# Patient Record
Sex: FEMALE | Race: WHITE | NOT HISPANIC OR LATINO | Employment: FULL TIME | URBAN - METROPOLITAN AREA
[De-identification: names, ages, dates, MRNs, and addresses within clinical notes are randomized per-mention and may not be internally consistent; named-entity substitution may affect disease eponyms.]

---

## 2017-01-25 ENCOUNTER — HOSPITAL ENCOUNTER (OUTPATIENT)
Dept: ULTRASOUND IMAGING | Facility: CLINIC | Age: 52
Discharge: HOME | End: 2017-01-25
Attending: FAMILY MEDICINE

## 2017-01-25 ENCOUNTER — COMMUNICATION - HEALTHEAST (OUTPATIENT)
Dept: FAMILY MEDICINE | Facility: CLINIC | Age: 52
End: 2017-01-25

## 2017-01-25 ENCOUNTER — OFFICE VISIT - HEALTHEAST (OUTPATIENT)
Dept: FAMILY MEDICINE | Facility: CLINIC | Age: 52
End: 2017-01-25

## 2017-01-25 DIAGNOSIS — Z78.9 ALCOHOL CONSUMPTION HEAVY: ICD-10-CM

## 2017-01-25 DIAGNOSIS — M54.9 BACK PAIN: ICD-10-CM

## 2017-01-25 DIAGNOSIS — Z00.8 ENCOUNTER FOR BIOMETRIC SCREENING: ICD-10-CM

## 2017-01-25 DIAGNOSIS — F10.10 ALCOHOL ABUSE, UNCOMPLICATED: ICD-10-CM

## 2017-01-25 DIAGNOSIS — Z82.69 FAMILY HISTORY OF RHEUMATIC JOINT DISEASE: ICD-10-CM

## 2017-01-25 LAB
CHOLEST SERPL-MCNC: 178 MG/DL
FASTING STATUS PATIENT QL REPORTED: YES
HDLC SERPL-MCNC: 64 MG/DL
LDLC SERPL CALC-MCNC: 107 MG/DL
TRIGL SERPL-MCNC: 35 MG/DL

## 2017-01-26 ENCOUNTER — COMMUNICATION - HEALTHEAST (OUTPATIENT)
Dept: FAMILY MEDICINE | Facility: CLINIC | Age: 52
End: 2017-01-26

## 2017-05-04 ENCOUNTER — AMBULATORY - HEALTHEAST (OUTPATIENT)
Dept: FAMILY MEDICINE | Facility: CLINIC | Age: 52
End: 2017-05-04

## 2017-05-04 ENCOUNTER — COMMUNICATION - HEALTHEAST (OUTPATIENT)
Dept: FAMILY MEDICINE | Facility: CLINIC | Age: 52
End: 2017-05-04

## 2017-05-04 DIAGNOSIS — Z00.00 HEALTHCARE MAINTENANCE: ICD-10-CM

## 2017-09-24 ENCOUNTER — HEALTH MAINTENANCE LETTER (OUTPATIENT)
Age: 52
End: 2017-09-24

## 2018-07-12 ENCOUNTER — COMMUNICATION - HEALTHEAST (OUTPATIENT)
Dept: TELEHEALTH | Facility: CLINIC | Age: 53
End: 2018-07-12

## 2018-07-12 ENCOUNTER — HOSPITAL ENCOUNTER (OUTPATIENT)
Dept: MAMMOGRAPHY | Facility: CLINIC | Age: 53
Discharge: HOME | End: 2018-07-12
Attending: FAMILY MEDICINE

## 2018-07-12 DIAGNOSIS — Z12.31 VISIT FOR SCREENING MAMMOGRAM: ICD-10-CM

## 2018-09-21 ENCOUNTER — OFFICE VISIT - HEALTHEAST (OUTPATIENT)
Dept: FAMILY MEDICINE | Facility: CLINIC | Age: 53
End: 2018-09-21

## 2018-09-21 DIAGNOSIS — Z00.00 ROUTINE GENERAL MEDICAL EXAMINATION AT A HEALTH CARE FACILITY: ICD-10-CM

## 2018-09-21 LAB
CHOLEST SERPL-MCNC: 214 MG/DL
FASTING STATUS PATIENT QL REPORTED: YES
FASTING STATUS PATIENT QL REPORTED: YES
GLUCOSE BLD-MCNC: 81 MG/DL (ref 70–125)
HDLC SERPL-MCNC: 72 MG/DL
LDLC SERPL CALC-MCNC: 131 MG/DL
TRIGL SERPL-MCNC: 54 MG/DL

## 2018-09-21 RX ORDER — MULTIVIT-MIN/IRON/FOLIC/HRB186 3.3 MG-25
TABLET ORAL
Status: SHIPPED | COMMUNITY
Start: 2018-09-21

## 2018-09-21 ASSESSMENT — MIFFLIN-ST. JEOR: SCORE: 1248.47

## 2018-09-24 ENCOUNTER — COMMUNICATION - HEALTHEAST (OUTPATIENT)
Dept: FAMILY MEDICINE | Facility: CLINIC | Age: 53
End: 2018-09-24

## 2018-11-01 ENCOUNTER — COMMUNICATION - HEALTHEAST (OUTPATIENT)
Dept: FAMILY MEDICINE | Facility: CLINIC | Age: 53
End: 2018-11-01

## 2019-02-22 ENCOUNTER — COMMUNICATION - HEALTHEAST (OUTPATIENT)
Dept: FAMILY MEDICINE | Facility: CLINIC | Age: 54
End: 2019-02-22

## 2019-07-29 ENCOUNTER — RECORDS - HEALTHEAST (OUTPATIENT)
Dept: ADMINISTRATIVE | Facility: OTHER | Age: 54
End: 2019-07-29

## 2019-08-06 ENCOUNTER — RECORDS - HEALTHEAST (OUTPATIENT)
Dept: ADMINISTRATIVE | Facility: OTHER | Age: 54
End: 2019-08-06

## 2019-08-13 ENCOUNTER — RECORDS - HEALTHEAST (OUTPATIENT)
Dept: ADMINISTRATIVE | Facility: OTHER | Age: 54
End: 2019-08-13

## 2019-08-29 ENCOUNTER — RECORDS - HEALTHEAST (OUTPATIENT)
Dept: ADMINISTRATIVE | Facility: OTHER | Age: 54
End: 2019-08-29

## 2019-09-25 ENCOUNTER — RECORDS - HEALTHEAST (OUTPATIENT)
Dept: ADMINISTRATIVE | Facility: OTHER | Age: 54
End: 2019-09-25

## 2021-02-19 ENCOUNTER — RECORDS - HEALTHEAST (OUTPATIENT)
Dept: ADMINISTRATIVE | Facility: OTHER | Age: 56
End: 2021-02-19

## 2021-05-28 ENCOUNTER — RECORDS - HEALTHEAST (OUTPATIENT)
Dept: ADMINISTRATIVE | Facility: CLINIC | Age: 56
End: 2021-05-28

## 2021-05-29 ENCOUNTER — RECORDS - HEALTHEAST (OUTPATIENT)
Dept: ADMINISTRATIVE | Facility: CLINIC | Age: 56
End: 2021-05-29

## 2021-05-30 ENCOUNTER — RECORDS - HEALTHEAST (OUTPATIENT)
Dept: ADMINISTRATIVE | Facility: CLINIC | Age: 56
End: 2021-05-30

## 2021-05-30 VITALS — BODY MASS INDEX: 22.82 KG/M2 | WEIGHT: 141.4 LBS

## 2021-06-01 ENCOUNTER — RECORDS - HEALTHEAST (OUTPATIENT)
Dept: ADMINISTRATIVE | Facility: CLINIC | Age: 56
End: 2021-06-01

## 2021-06-02 ENCOUNTER — RECORDS - HEALTHEAST (OUTPATIENT)
Dept: ADMINISTRATIVE | Facility: CLINIC | Age: 56
End: 2021-06-02

## 2021-06-02 VITALS — BODY MASS INDEX: 22.77 KG/M2 | WEIGHT: 141.7 LBS | HEIGHT: 66 IN
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
## 2021-06-20 ENCOUNTER — HEALTH MAINTENANCE LETTER (OUTPATIENT)
Age: 56
End: 2021-06-20

## 2021-06-20 NOTE — PROGRESS NOTES
ASSESSMENT:  1. Routine general medical examination at a health care facility     - Lipid Cascade  - Glucose  - Tdap vaccine greater than or equal to 8yo IM  - Influenza, Seasonal,Quad Inj, 36+ MOS         PLAN:    Orders Placed This Encounter   Procedures     Tdap vaccine greater than or equal to 8yo IM     Influenza, Seasonal,Quad Inj, 36+ MOS     Lipid Cascade     Order Specific Question:   Fasting is required?     Answer:   Yes     Glucose     There are no discontinued medications.    No Follow-up on file.    Health Maintenance Due   Topic Date Due     DEPRESSION FOLLOW UP  1965     COLONOSCOPY  06/05/2013     INFLUENZA VACCINE RULE BASED (1) 08/01/2018     TD 18+ HE  03/06/2019       CHIEF COMPLAINT:  Chief Complaint   Patient presents with     Annual Exam     fasting labs       HISTORY OF PRESENT ILLNESS:  Georgina Paz is a 53 y.o. female presenting to the clinic today for a physical exam.     She is a biometric screening completed for her work.    She has had a colonoscopy and they found something like 8 or 9 polyps a told her to come back in 2-3 years she has had that done and now she think she is on a 5 year plan but we do not have any records so we have had to ask her to contact the clinic where she had a colonoscopy is completed and send us the records.  My assistant called [facility] and they do not have a record of her.    Healthy Habits:    Patient reports regular exercise, dental and eye exams. Uses healthy diet. Always uses seatbelts. Reports uses medications as directed.  Alcohol: 4 per week  Smoking: none  Caffeine use: 2 per day  Drug use: none  Birth control: abstinence    REVIEW OF SYSTEMS:   All other systems are negative.    Immunization History   Administered Date(s) Administered     DT (pediatric) 10/01/1999     Hep A, historic 05/30/2007, 03/06/2009     Influenza,seasonal, Inj IIV3 10/10/2012     Tdap 03/06/2009       GYNECOLOGIC HISTORY:  Last menstrual  period: menopause  Contraception: abstinence  Last Pap: 14 Results were: normal  Last mammogram: 16  Results were: normal    OB History      Para Term  AB Living    2 2 2   2    SAB TAB Ectopic Multiple Live Births        2        Obstetric Comments     X 2          PFSH:     History   Smoking Status     Former Smoker     Years: 5.00     Quit date: 2009   Smokeless Tobacco     Never Used     Family History   Problem Relation Age of Onset     Arthritis Mother      Cancer Mother      Hearing loss Mother      Vision loss Mother      Alcohol abuse Father      Depression Father      Diabetes Father      Heart disease Father      Hypertension Father      Mental illness Father      Vision loss Father      Vision loss Sister      Alcohol abuse Brother      Arthritis Brother      Depression Brother      Hypertension Brother      Mental illness Brother      Hearing loss Maternal Grandmother      Cancer Maternal Grandfather      Heart disease Maternal Grandfather      Vision loss Brother      Social History     Social History     Marital status:      Spouse name: N/A     Number of children: 2     Years of education: N/A     Occupational History     assist Chief  [employer]     Social History Main Topics     Smoking status: Former Smoker     Years: 5.00     Quit date: 2009     Smokeless tobacco: Never Used     Alcohol use 2.4 oz/week     4 Glasses of wine per week     Drug use: No     Sexual activity: Not Currently     Partners: Male     Birth control/ protection: Abstinence     Other Topics Concern     None     Social History Narrative     Past Surgical History:   Procedure Laterality Date     BREAST BIOPSY Right      LASIK  2002     LA BIOPSY OF BREAST, NEEDLE CORE      Description: Biopsy Breast Percutaneous Needle Core;  Recorded: 2010;  Comments: 3/10     No Known Allergies  Active Ambulatory Problems     Diagnosis Date Noted     Perioral Dermatitis       "Lateral Epicondylitis Of The Right Elbow      Depression      Abnormal Pap Smear Of Cervix      Allergies      Segmental Dysfunction Of Sacroiliac Region      Bloating (Symptom)      Resolved Ambulatory Problems     Diagnosis Date Noted     No Resolved Ambulatory Problems     Past Medical History:   Diagnosis Date     Depression      Depression      Perioral Dermatitis      Segmental Dysfunction Of Sacroiliac Region        VITALS:  Vitals:    09/21/18 1333   BP: 112/64   Patient Site: Right Arm   Patient Position: Sitting   Cuff Size: Adult Regular   Pulse: 68   Weight: 141 lb 11.2 oz (64.3 kg)   Height: 5' 6.25\" (1.683 m)     BP Readings from Last 3 Encounters:   09/21/18 112/64   01/25/17 108/60   09/09/16 108/66     Wt Readings from Last 3 Encounters:   09/21/18 141 lb 11.2 oz (64.3 kg)   01/25/17 141 lb 6.4 oz (64.1 kg)   09/09/16 139 lb 1.6 oz (63.1 kg)     Body mass index is 22.7 kg/(m^2).    PHYSICAL EXAM:  General Appearance: Alert, cooperative, no distress, appears stated age  Head: Normocephalic, without obvious abnormality, atraumatic  Eyes: PERRL, conjunctiva/corneas clear, EOM's intact  Ears: Normal TM's and external ear canals, both ears  Nose: Nares normal, septum midline,mucosa normal, no drainage  Throat: Lips, mucosa, and tongue normal; teeth and gums normal  Neck: Supple, symmetrical, trachea midline, no adenopathy;  thyroid: not enlarged, symmetric, no tenderness/mass/nodules;   Back: Symmetric, no curvature, ROM normal, no CVA tenderness  Lungs: Clear to auscultation bilaterally, respirations unlabored  Breasts: No breast masses, tenderness, asymmetry, or nipple discharge.  Heart: Regular rate and rhythm, S1 and S2 normal, no murmur, rub, or gallop, Abdomen: Soft, non-tender, bowel sounds active all four quadrants,  no masses, no organomegaly  Pelvic:Not examined  Extremities: Extremities normal, atraumatic, no cyanosis or edema  Skin: Skin color, texture, turgor normal, no rashes or " lesions  Lymph nodes: Cervical, supraclavicular, and axillary nodes normal  Neurologic: Normal       QUALITY MEASURES:  I have had an Advance Directives discussion with the patient.      MEDICATIONS:  Current Outpatient Prescriptions   Medication Sig Dispense Refill     multivitamin therapeutic (THERAGRAN) tablet Take 1 tablet by mouth daily.       glucosamine sulf-chondroitinSA 250-200 mg cap daily. Liquid form       No current facility-administered medications for this visit.

## 2021-06-24 NOTE — TELEPHONE ENCOUNTER
Who is calling:  Patient   Reason for Call:  Wants to know what she is to do for traveling out of country to [place], [facility], and [place].  Please advise.  Date of last appointment with primary care: 9/21/2018  Has the patient been recently seen:  No  Okay to leave a detailed message: Yes

## 2021-07-13 ENCOUNTER — RECORDS - HEALTHEAST (OUTPATIENT)
Dept: ADMINISTRATIVE | Facility: CLINIC | Age: 56
End: 2021-07-13

## 2021-07-21 ENCOUNTER — RECORDS - HEALTHEAST (OUTPATIENT)
Dept: ADMINISTRATIVE | Facility: CLINIC | Age: 56
End: 2021-07-21

## 2021-10-11 ENCOUNTER — HEALTH MAINTENANCE LETTER (OUTPATIENT)
Age: 56
End: 2021-10-11

## 2022-07-17 ENCOUNTER — HEALTH MAINTENANCE LETTER (OUTPATIENT)
Age: 57
End: 2022-07-17

## 2022-08-26 ENCOUNTER — TRANSFERRED RECORDS (OUTPATIENT)
Dept: HEALTH INFORMATION MANAGEMENT | Facility: CLINIC | Age: 57
End: 2022-08-26

## 2022-09-25 ENCOUNTER — HEALTH MAINTENANCE LETTER (OUTPATIENT)
Age: 57
End: 2022-09-25

## 2022-10-12 ENCOUNTER — TRANSCRIBE ORDERS (OUTPATIENT)
Dept: OTHER | Age: 57
End: 2022-10-12

## 2022-10-12 DIAGNOSIS — M81.0 OSTEOPOROSIS, UNSPECIFIED OSTEOPOROSIS TYPE, UNSPECIFIED PATHOLOGICAL FRACTURE PRESENCE: Primary | ICD-10-CM

## 2023-01-12 ENCOUNTER — OFFICE VISIT (OUTPATIENT)
Dept: ENDOCRINOLOGY | Facility: CLINIC | Age: 58
End: 2023-01-12
Payer: COMMERCIAL

## 2023-01-12 VITALS
RESPIRATION RATE: 16 BRPM | HEIGHT: 66 IN | BODY MASS INDEX: 22.85 KG/M2 | DIASTOLIC BLOOD PRESSURE: 71 MMHG | WEIGHT: 142.2 LBS | TEMPERATURE: 98.1 F | SYSTOLIC BLOOD PRESSURE: 110 MMHG | HEART RATE: 71 BPM | OXYGEN SATURATION: 99 %

## 2023-01-12 DIAGNOSIS — M81.0 OSTEOPOROSIS, UNSPECIFIED OSTEOPOROSIS TYPE, UNSPECIFIED PATHOLOGICAL FRACTURE PRESENCE: ICD-10-CM

## 2023-01-12 LAB
CALCIUM SERPL-MCNC: 10.5 MG/DL (ref 8.6–10)
CREAT SERPL-MCNC: 0.69 MG/DL (ref 0.51–0.95)
GFR SERPL CREATININE-BSD FRML MDRD: >90 ML/MIN/1.73M2

## 2023-01-12 PROCEDURE — 36415 COLL VENOUS BLD VENIPUNCTURE: CPT | Performed by: INTERNAL MEDICINE

## 2023-01-12 PROCEDURE — 82310 ASSAY OF CALCIUM: CPT | Performed by: INTERNAL MEDICINE

## 2023-01-12 PROCEDURE — 99204 OFFICE O/P NEW MOD 45 MIN: CPT | Performed by: INTERNAL MEDICINE

## 2023-01-12 PROCEDURE — 82565 ASSAY OF CREATININE: CPT | Performed by: INTERNAL MEDICINE

## 2023-01-12 RX ORDER — VITAMIN E (DL,TOCOPHERYL ACET) 180 MG
CAPSULE ORAL
COMMUNITY

## 2023-01-12 RX ORDER — CHLORAL HYDRATE 500 MG
1000 CAPSULE ORAL
COMMUNITY

## 2023-01-12 NOTE — LETTER
1/12/2023         RE: Georgina Paz  [id number] [place] [id number]        Dear Colleague,    Thank you for referring your patient, Georgina Paz, to the [facility]. Please see a copy of my visit note below.    Name: Georgina Paz  Date: 1/12/2023  Seen in consultation with Dr.Megan Bernal for osteoporosis.     HPI:  Georgina Paz is a 57 year old female who presents for the evaluation of osteoporosis.   has no past medical history on file.    DEXA 8/2022 at [facility]?- T score -3.6 at spine.  No images or results to review.   No other T-score avaialble.    No previous h/o of DEXA reports.  She had lifeline screening and that showed low BMD - that promted to get formal DEXA.    Lab work up at [facility] showed normal TSH, vit D, PTH.  Available records, labs and images from outside clinic were personally reviewed.      GERD: no    Dental health: ok    Kidney function: within normal limits    Previous treatment for osteopenia/osteoporosis: Never    Current treatment for Osteopenia/Osteoporosis: Never    Smoke: former smoker in her 14-21 year old.  Family History:Yes: mother  Menstrual history/Birthing: s/p menopause at 50. No HRT.  Fractures:Yes: Dec 2019- wrist fracture after fall s/p surgery  Kidney stones: No  GI Surgery:No  Duration of therapy:  None  Exercise:Yes: walking, cardio on treadmill and body weight exercises.  Diet:  2 servings of dairy/day  Ca/Vitamin D: 700 mg of calcium/day, 162. 5 mg of vit D/day  Alcohol:  No  Eating Disorder: Yes: from age 20- 33- she was underweight- restricting diet.  Steroid Use:  No  PMH/PSH:  History reviewed. No pertinent past medical history.  Past Surgical History:   Procedure Laterality Date     BIOPSY BREAST Right 2010     BIOPSY OF BREAST, NEEDLE CORE      Description: Biopsy Breast Percutaneous Needle Core;  Recorded: 08/27/2010;  Comments: 3/10     LASIK  2002     Family Hx:  Family History   Problem Relation  Age of Onset     Arthritis Mother      Cancer Mother      Hearing Loss Mother      Vision Loss Mother      Alcoholism Father      Depression Father      Diabetes Father      Heart Disease Father      Hypertension Father      Mental Illness Father      Vision Loss Father      Vision Loss Sister      Alcoholism Brother      Arthritis Brother      Depression Brother      Hypertension Brother      Mental Illness Brother      Hearing Loss Maternal Grandmother      Cancer Maternal Grandfather      Heart Disease Maternal Grandfather      Vision Loss Brother      Social Hx:  Social History     Socioeconomic History     Marital status:      Spouse name: Not on file     Number of children: 2     Years of education: Not on file     Highest education level: Not on file   Occupational History     Not on file   Tobacco Use     Smoking status: Former     Years: 5.00     Types: Cigarettes     Quit date: 2009     Years since quittin.5     Smokeless tobacco: Never   Substance and Sexual Activity     Alcohol use: Yes     Alcohol/week: 4.0 standard drinks     Drug use: No     Sexual activity: Not Currently     Partners: Male     Birth control/protection: Abstinence   Other Topics Concern     Not on file   Social History Narrative     Not on file     Social Determinants of Health     Financial Resource Strain: Not on file   Food Insecurity: Not on file   Transportation Needs: Not on file   Physical Activity: Not on file   Stress: Not on file   Social Connections: Not on file   Intimate Partner Violence: Not on file   Housing Stability: Not on file          MEDICATIONS:  has a current medication list which includes the following prescription(s): glucosamine-chondroitin and multiple vitamin.    ROS     ROS: 10 point ROS neg other than the symptoms noted above in the HPI.    Physical Exam   VS: /71 (BP Location: Left arm, Patient Position: Chair, Cuff Size: Adult Regular)   Pulse 71   Temp 98.1  F (36.7  C)  "(Tympanic)   Resp 16   Ht 1.676 m (5' 6\")   Wt 64.5 kg (142 lb 3.2 oz)   LMP  (LMP Unknown)   SpO2 99%   Breastfeeding No   BMI 22.95 kg/m    GENERAL: healthy, alert and no distress  EYES: Eyes grossly normal to inspection, conjunctivae and sclerae normal  ENT: no nose swelling, nasal discharge.  Thyroid: no apparent thyroid nodules.  Thyroid appears normal in size and nontender.  CV: RRR, no rubs, gallops, no murmurs  RESP: CTAB, no wheezes, rales, or ronchi  ABDO: +BS  EXTREMITIES: no hand tremors.  NEURO: Cranial nerves grossly intact, mentation intact and speech normal  SPINE: Midline, no TTP.  SKIN: No apparent skin lesions, rash or edema seen   PSYCH: mentation appears normal, affect normal/bright, judgement and insight intact, normal speech and appearance well-groomed        LABS:  Calcium:    Creatinine:  No results found for: CR    TFTs:  No results found for: TSH    All pertinent notes, labs, and images personally reviewed by me.     A/P  Ms.Christine ADOLFO Paz is a 57 year old here for the evaluation of:    (M81.0) Osteoporosis, unspecified osteoporosis type, unspecified pathological fracture presence  Comment: Risk factors for low bone density include family history, , female, h/o restrictive diet,Estrogen deficiency, low Ca intake.  DEXA 8/2022 at [facility] [place]?- T score -3.6 at spine.  Lab work up at PCP showed normal TSH, vit D, PTH.  Plan:   Discussed diagnosis, pathophysiology, management and treatment options of condition with pt.  Plan to get records of DEXA scan from outside clinic.  T-score -3.6 at spine per records.  Never been on medication.  Discussed treatment options including oral bisphosphates. Can be a candidate for Forteo or Tymlos in the setting of sever osteoporosis with T-score -3.6.  She is interested in that as compared to oral bisphosphonates.  Recommend labs as follows:  Follow up after that.  She will check cost coverage with insurance.  Plan: Calcium, " Creatinine          The pt was advised to    Maintain an adequate calcium and vitamin D intake and to supplement vitamin D if needed to maintain serum levels of 25 hydroxy D (25 OH D) between 30-60 ng/ml.    Limit alcohol intake to no more than 2 servings per day.    Limit caffeine intake.    Maintain an active lifestyle including weight-bearing exercises for at least 30 mins daily.    Take measures to reduce the risk of falling.      A prior history of vertebral fracture greatly increases the risk of subsequent fractures. A history of other medical diseases impacting on bone may also affect bone health.      The 24-hour urine calcium value, if low (< 50 mg/24 hour), would suggest the presence of vitamin D deficiency due to poor dietary intake or malabsorption. A low 24-hour urine calcium should be followed by a serum 25-hydroxyvitamin D level to test for possible vitamin D deficiency. The identification of vitamin D deficiency should prompt the search for possible occult malabsorption due sprue. Twenty-four hour urine calcium values over 300 mg should prompt an evaluation for possible causes of hypercalciuria.    Bone turnover markers can also be helpful in determing duration of therapy and compliance.  Osteocalcin is a bone formation marker (serum) and N-telopeptide of collagen cross links (NTx) is found in the urine and is a bone resorption maker.      All questions were answered.  The patient indicates understanding of the above issues and agrees with the plan set forth.        Follow-up:  After above.    Diane Zaragoza MD  Endocrinology  [facility]/[place]  CC: Valerie Grier        Again, thank you for allowing me to participate in the care of your patient.        Sincerely,        Diane Zaragoza MD

## 2023-01-12 NOTE — PATIENT INSTRUCTIONS
[facility]  Dr Zaragoza, Endocrinology Department    [facility]   [address] [id number]  [place] [address]  Appointment [phone]  Fax: [phone]  [place]: Monday - Thursday      Please check the cost coverage and copay with insurance before recommended tests, services and medications (especially if new medications are prescribed).     If ordered, please get blood work done 1 week prior to your next appointment so they will be available to Dr. Zaragoza at your visit.    Labs today.  Can consider FOSAMAX after that.    Check cost of FORTEO or Tymlos for osteoporosis.    The pt was advised to  Maintain an adequate calcium and vitamin D intake and to supplement vitamin D if needed to maintain serum levels of 25 hydroxy D (25 OH D) between 30-60 ng/ml.  Limit alcohol intake to no more than 2 servings per day.  Limit caffeine intake.  Maintain an active lifestyle including weight-bearing exercises for at least 30 mins daily.  Take measures to reduce the risk of falling.    You should get 1200 mg/day calcium in divided doses of no more than 500 mg/dose.  This INCLUDES what is in your food as well as what is in any supplements you may be taking.    Dietary sources of calcium:: These also contain vitamin D  Milk                            8 oz            300 mg calcium  Yogurt                          1 cup           400 mg calcium   Hard cheese                     1.5 oz          300 mg  Cottage cheese                  2 cup           300 mg  Orange juice with Calcium       8 oz            300 mg  Low fat dairy sources are recommended      You should get 30 minutes of moderate weight bearing exercise on most days of the week .  Weight bearing exercise includes such things as walking, jogging, hiking, dancing.  You should also get Strength training 2 or more times/week in addition to other weight -being exercise. Strength training uses weight or resistance  beyond that seen in everyday activities -(pilates, weight training with free weights, weight machines or resistance bands)    Living with Osteoporosis: Preventing Fractures  If you have osteoporosis, you can do a lot to reduce its effect on your life. Knowing how to prevent fractures and spinal curvature can help you live more comfortably and safely with this disease.  Reducing your risk for fractures  The most common fracture sites in people with osteoporosis are the wrist, spine, and hip. These fractures are often caused by accidents and falls. All fractures are painful and may limit what you can do. But hip fractures are very serious. They often need surgery, and it can take months to recover. To reduce your risk for fractures:  Get regular exercise. Try walking, swimming, or weight training.  Eat foods that are rich in calcium, or take calcium supplements.  Make your home safe to help avoid accidents.  Take extra precautions not to fall in risky areas, such as icy sidewalks.  Understanding spinal fractures  Your spine is made up of many bones called vertebrae. Osteoporosis can cause the vertebrae in your spine to collapse. As a result, your upper back may arch forward, creating a curvature. Spine fractures may also result from back strain and bad posture. You will also lose height. Your lower spine must then adjust to keep your body balanced. This can cause back pain. To prevent or lessen these spinal changes:  Practice good posture.  Use proper techniques if you need to lift heavy objects.  Do back exercises to help your posture.  Lie on your back when you have pain.  Ask your healthcare provider about these and other ways to help your spine.  [employer] last reviewed this educational content on 5/1/2018 2000-2021 [employer]. All rights reserved. This information is not intended as a substitute for professional medical care. Always follow your healthcare professional's instructions.           Living with Osteoporosis: Regular Exercise  If you have osteoporosis, exercise is vital for your health. It can prevent bone fractures and spine changes. It will slow bone loss. Exercise will strengthen your body. It can also be fun. A variety of exercises is best. See below for exercises that can help you. But before you start, talk with your healthcare provider to be sure these exercises are right for you.   Resistance exercises. These build muscle strength and maintain bone mass. They also make you less prone to injury. Exercises include lifting small weights, doing push-ups and sit-ups, using elastic exercise bands, and using weight machines.   Weight-bearing activities. These help your whole body. They also help you maintain bone mass. Activities include walking, dancing, and housework.   Non-weight-bearing exercises. These help prevent back strain and pain. They do this by building the trunk and leg muscles. Exercises that help with flexibility can prevent falls. Examples include swimming, water exercise, and stretching.   Staying safe  Here are tips to stay safe:   Always check with your healthcare provider before starting any new exercise program.  Use weights only as instructed.  Stop any exercise that causes pain.  [employer] last reviewed this educational content on 5/1/2018 2000-2021 [employer]. All rights reserved. This information is not intended as a substitute for professional medical care. Always follow your healthcare professional's instructions.          Preventing Osteoporosis: Avoiding Bone Loss  Certain factors can speed up bone loss or decrease bone growth. For example, alcohol, cigarettes, and certain medicines reduce bone mass. Some foods make it hard for your body to absorb calcium.  Things to avoid  Here are things to avoid to help prevent osteoporosis:  Alcohol. This is toxic to bones. It is a major cause of bone loss. Heavy drinking can cause osteoporosis even if you  have no other risk factors.  Smoking. This reduces bone mass. Smoking may also interfere with estrogen levels and cause early menopause.  Inactivity. Not being active makes your bones lose strength and become thinner. Over time, thin bones may break. Women who aren't active are at a high risk for osteoporosis.  Certain medicines. Some medicines, such as cortisone, increase bone loss. They also decrease bone growth. Ask your healthcare provider about any side effects of your medicines, and how to prevent them.  Protein-rich or salty foods. Eaten in large amounts, these foods may deplete calcium.  Caffeine. This increases calcium loss. People who drink a lot of coffee, tea, or soda lose more calcium than those who don't.  [employer] last reviewed this educational content on 5/1/2018 2000-2021 [employer]. All rights reserved. This information is not intended as a substitute for professional medical care. Always follow your healthcare professional's instructions.          Preventing Osteoporosis: Meeting Your Calcium Needs  Your body needs calcium to build and repair bones. But it can't make calcium on its own. That's why it's important to eat calcium-rich foods. Some foods are naturally rich in calcium. Others have calcium added (fortified). It's best to get calcium from the foods you eat. But if you can't get enough, you may want to take calcium supplements. To meet your daily calcium needs, try the foods listed below.               Dairy Fish & beans Other sources   Source   Calcium (mg) per serving   Source   Calcium (mg) per serving   Source   Calcium (mg) per serving   Low-fat yogurt, plain   415 mg/8 oz.   Sardines, Atlantic, canned, with bones   351 mg/3 oz.   Oatmeal, instant, fortified   215 mg/1 cup   Nonfat milk   302 mg/1 cup   Sunshine, sockeye, canned, with bones   239 mg/3 oz.   Tofu made with calcium sulfate   204 mg/3 oz.   Low-fat milk   297 mg/1 cup   Soybeans, fresh, boiled   131  mg/1/2 cup   Collards   179 mg/1/2 cup   Swiss cheese   272 mg/1 oz.   White beans, cooked   81 mg/1/2 cup   English muffin, whole wheat   175 mg/1 muffin   Cheddar cheese   205 mg/1 oz.   Navy beans, cooked   79 mg/1/2 cup   Kale   90 mg/1/2 cup   Ice cream strawberry   79 mg/1/2 cup           Orange, navel   56 mg/1 medium   Note: Calcium levels may vary depending on brand and size.  Daily calcium needs  14 to 18 years old: 1,300 mg  19 to 30 years old: 1,000 mg  31 to 50 years old: 1,000 mg  51 to 70 years old, women: 1,200 mg  51 to 70 years old, men: 1,000 mg  Pregnant or nursin to 18 years old: 1,300 mg, 19 to 50 years old: 1,000 mg  Older than 70 (women and men): 1,200 mg   [employer] last reviewed this educational content on 2018-2021 [employer]. All rights reserved. This information is not intended as a substitute for professional medical care. Always follow your healthcare professional's instructions.

## 2023-01-12 NOTE — PROGRESS NOTES
Name: Georgina Paz  Date: 1/12/2023  Seen in consultation with Dr.Megan Bernal for osteoporosis.     HPI:  Georgina Paz is a 57 year old female who presents for the evaluation of osteoporosis.   has no past medical history on file.    DEXA 8/2022 at [facility]?- T score -3.6 at spine.  No images or results to review.   No other T-score avaialble.    No previous h/o of DEXA reports.  She had lifeline screening and that showed low BMD - that promted to get formal DEXA.    Lab work up at PCP showed normal TSH, vit D, PTH.  Available records, labs and images from outside clinic were personally reviewed.      GERD: no    Dental health: ok    Kidney function: within normal limits    Previous treatment for osteopenia/osteoporosis: Never    Current treatment for Osteopenia/Osteoporosis: Never    Smoke: former smoker in her 16-21 year old.  Family History:Yes: mother  Menstrual history/Birthing: s/p menopause at 50. No HRT.  Fractures:Yes: Dec 2019- wrist fracture after fall s/p surgery  Kidney stones: No  GI Surgery:No  Duration of therapy:  None  Exercise:Yes: walking, cardio on treadmill and body weight exercises.  Diet:  2 servings of dairy/day  Ca/Vitamin D: 700 mg of calcium/day, 162. 5 mg of vit D/day  Alcohol:  No  Eating Disorder: Yes: from age 20- 33- she was underweight- restricting diet.  Steroid Use:  No  PMH/PSH:  History reviewed. No pertinent past medical history.  Past Surgical History:   Procedure Laterality Date     BIOPSY BREAST Right 2010     BIOPSY OF BREAST, NEEDLE CORE      Description: Biopsy Breast Percutaneous Needle Core;  Recorded: 08/27/2010;  Comments: 3/10     LASIK  2002     Family Hx:  Family History   Problem Relation Age of Onset     Arthritis Mother      Cancer Mother      Hearing Loss Mother      Vision Loss Mother      Alcoholism Father      Depression Father      Diabetes Father      Heart Disease Father      Hypertension Father      Mental Illness Father      Vision  "Loss Father      Vision Loss Sister      Alcoholism Brother      Arthritis Brother      Depression Brother      Hypertension Brother      Mental Illness Brother      Hearing Loss Maternal Grandmother      Cancer Maternal Grandfather      Heart Disease Maternal Grandfather      Vision Loss Brother      Social Hx:  Social History     Socioeconomic History     Marital status:      Spouse name: Not on file     Number of children: 2     Years of education: Not on file     Highest education level: Not on file   Occupational History     Not on file   Tobacco Use     Smoking status: Former     Years: 5.00     Types: Cigarettes     Quit date: 2009     Years since quittin.5     Smokeless tobacco: Never   Substance and Sexual Activity     Alcohol use: Yes     Alcohol/week: 4.0 standard drinks     Drug use: No     Sexual activity: Not Currently     Partners: Male     Birth control/protection: Abstinence   Other Topics Concern     Not on file   Social History Narrative     Not on file     Social Determinants of Health     Financial Resource Strain: Not on file   Food Insecurity: Not on file   Transportation Needs: Not on file   Physical Activity: Not on file   Stress: Not on file   Social Connections: Not on file   Intimate Partner Violence: Not on file   Housing Stability: Not on file          MEDICATIONS:  has a current medication list which includes the following prescription(s): glucosamine-chondroitin and multiple vitamin.    ROS     ROS: 10 point ROS neg other than the symptoms noted above in the HPI.    Physical Exam   VS: /71 (BP Location: Left arm, Patient Position: Chair, Cuff Size: Adult Regular)   Pulse 71   Temp 98.1  F (36.7  C) (Tympanic)   Resp 16   Ht 1.676 m (5' 6\")   Wt 64.5 kg (142 lb 3.2 oz)   LMP  (LMP Unknown)   SpO2 99%   Breastfeeding No   BMI 22.95 kg/m    GENERAL: healthy, alert and no distress  EYES: Eyes grossly normal to inspection, conjunctivae and sclerae " normal  ENT: no nose swelling, nasal discharge.  Thyroid: no apparent thyroid nodules.  Thyroid appears normal in size and nontender.  CV: RRR, no rubs, gallops, no murmurs  RESP: CTAB, no wheezes, rales, or ronchi  ABDO: +BS  EXTREMITIES: no hand tremors.  NEURO: Cranial nerves grossly intact, mentation intact and speech normal  SPINE: Midline, no TTP.  SKIN: No apparent skin lesions, rash or edema seen   PSYCH: mentation appears normal, affect normal/bright, judgement and insight intact, normal speech and appearance well-groomed        LABS:  Calcium:    Creatinine:  No results found for: CR    TFTs:  No results found for: TSH    All pertinent notes, labs, and images personally reviewed by me.     A/P  Ms.Christine ADOLFO Paz is a 57 year old here for the evaluation of:    (M81.0) Osteoporosis, unspecified osteoporosis type, unspecified pathological fracture presence  Comment: Risk factors for low bone density include family history, , female, h/o restrictive diet,Estrogen deficiency, low Ca intake.  DEXA 8/2022 at [facility]?- T score -3.6 at spine.  Lab work up at PCP showed normal TSH, vit D, PTH.  Plan:   Discussed diagnosis, pathophysiology, management and treatment options of condition with pt.  Plan to get records of DEXA scan from outside clinic.  T-score -3.6 at spine per records.  Never been on medication.  Discussed treatment options including oral bisphosphates. Can be a candidate for Forteo or Tymlos in the setting of sever osteoporosis with T-score -3.6.  She is interested in that as compared to oral bisphosphonates.  Recommend labs as follows:  Follow up after that.  She will check cost coverage with insurance.  Plan: Calcium, Creatinine          The pt was advised to    Maintain an adequate calcium and vitamin D intake and to supplement vitamin D if needed to maintain serum levels of 25 hydroxy D (25 OH D) between 30-60 ng/ml.    Limit alcohol intake to no more than 2 servings per  day.    Limit caffeine intake.    Maintain an active lifestyle including weight-bearing exercises for at least 30 mins daily.    Take measures to reduce the risk of falling.      A prior history of vertebral fracture greatly increases the risk of subsequent fractures. A history of other medical diseases impacting on bone may also affect bone health.      The 24-hour urine calcium value, if low (< 50 mg/24 hour), would suggest the presence of vitamin D deficiency due to poor dietary intake or malabsorption. A low 24-hour urine calcium should be followed by a serum 25-hydroxyvitamin D level to test for possible vitamin D deficiency. The identification of vitamin D deficiency should prompt the search for possible occult malabsorption due sprue. Twenty-four hour urine calcium values over 300 mg should prompt an evaluation for possible causes of hypercalciuria.    Bone turnover markers can also be helpful in determing duration of therapy and compliance.  Osteocalcin is a bone formation marker (serum) and N-telopeptide of collagen cross links (NTx) is found in the urine and is a bone resorption maker.      All questions were answered.  The patient indicates understanding of the above issues and agrees with the plan set forth.        Follow-up:  After above.    Diane Zaragoza MD  Endocrinology  [facility]  CC: Valerie Grier

## 2023-08-05 ENCOUNTER — HEALTH MAINTENANCE LETTER (OUTPATIENT)
Age: 58
End: 2023-08-05

## 2023-10-14 ENCOUNTER — HEALTH MAINTENANCE LETTER (OUTPATIENT)
Age: 58
End: 2023-10-14

## 2024-12-07 ENCOUNTER — HEALTH MAINTENANCE LETTER (OUTPATIENT)
Age: 59
End: 2024-12-07

## 2025-08-09 ENCOUNTER — HEALTH MAINTENANCE LETTER (OUTPATIENT)
Age: 60
End: 2025-08-09